# Patient Record
Sex: MALE | Race: WHITE | NOT HISPANIC OR LATINO | URBAN - METROPOLITAN AREA
[De-identification: names, ages, dates, MRNs, and addresses within clinical notes are randomized per-mention and may not be internally consistent; named-entity substitution may affect disease eponyms.]

---

## 2024-09-06 ENCOUNTER — EMERGENCY (EMERGENCY)
Facility: HOSPITAL | Age: 84
LOS: 1 days | Discharge: HOME | End: 2024-09-06
Attending: STUDENT IN AN ORGANIZED HEALTH CARE EDUCATION/TRAINING PROGRAM
Payer: MEDICARE

## 2024-09-06 VITALS
RESPIRATION RATE: 18 BRPM | WEIGHT: 235.89 LBS | TEMPERATURE: 98 F | HEART RATE: 87 BPM | SYSTOLIC BLOOD PRESSURE: 107 MMHG | OXYGEN SATURATION: 94 % | DIASTOLIC BLOOD PRESSURE: 56 MMHG | HEIGHT: 71 IN

## 2024-09-06 LAB
HCT VFR BLD CALC: 35.7 % — LOW (ref 39–50)
HGB BLD-MCNC: 12.1 G/DL — LOW (ref 13–17)
MCHC RBC-ENTMCNC: 32.5 PG — SIGNIFICANT CHANGE UP (ref 27–34)
MCHC RBC-ENTMCNC: 33.9 GM/DL — SIGNIFICANT CHANGE UP (ref 32–36)
MCV RBC AUTO: 96 FL — SIGNIFICANT CHANGE UP (ref 80–100)
PLATELET # BLD AUTO: 155 K/UL — SIGNIFICANT CHANGE UP (ref 150–400)
RBC # BLD: 3.72 M/UL — LOW (ref 4.2–5.8)
RBC # FLD: 13.2 % — SIGNIFICANT CHANGE UP (ref 10.3–14.5)
WBC # BLD: 6.98 K/UL — SIGNIFICANT CHANGE UP (ref 3.8–10.5)
WBC # FLD AUTO: 6.98 K/UL — SIGNIFICANT CHANGE UP (ref 3.8–10.5)

## 2024-09-06 PROCEDURE — 36415 COLL VENOUS BLD VENIPUNCTURE: CPT

## 2024-09-06 PROCEDURE — 99283 EMERGENCY DEPT VISIT LOW MDM: CPT | Mod: GC

## 2024-09-06 PROCEDURE — 99283 EMERGENCY DEPT VISIT LOW MDM: CPT

## 2024-09-06 PROCEDURE — 85027 COMPLETE CBC AUTOMATED: CPT

## 2024-09-06 NOTE — ED PROVIDER NOTE - PHYSICAL EXAMINATION
General: Awake, alert, lying in bed in NAD  HEENT: Normocephalic, atraumatic. No scleral icterus or conjunctival injection. EOMI. Moist mucous membranes. Oropharynx clear.   Neck:. Soft and supple.  Cardiac: RRR, Peripheral pulses 2+ and symmetric. No LE edema.  Resp: Lungs CTAB. No accessory muscle use  Abd: Soft, non-tender, non-distended. No guarding, rebound, or rigidity.  Back: Spine midline and non-tender.   Skin: 5mm  skin tear vs punctate wound to medial right lower leg, no active bleeding. multiple BLE varicose veins appreciated.   Neuro: AO x 4. Moves all extremities symmetrically. Motor strength and sensation grossly intact.  Psych: Appropriate mood and affect

## 2024-09-06 NOTE — ED PROVIDER NOTE - CLINICAL SUMMARY MEDICAL DECISION MAKING FREE TEXT BOX
84y male denies pmh BIBA for likely bleeding RLE varicose vein. bleeding controlled by pressure dressing by EMS. dressing removed without any active bleeding. wound was redressed. will check an H&H given amount of blood loss seen on phone pictures. patient otherwise well appearing, intact pulses, sensation, and motor. lower extremities non tender without any signs of infection. 84y male denies pmh BIBA for likely bleeding RLE varicose vein. bleeding controlled by pressure dressing by EMS. dressing removed without any active bleeding. wound was redressed. will check an H&H given amount of blood loss seen on phone pictures. patient otherwise well appearing, intact pulses, sensation, and motor. lower extremities non tender without any signs of infection.    H&H is 12.1/35.7, mildly decreased but not critical. wound care instructions given. patient to follow up with PCP or ED for wound check. return precautions given. stable for discharge. 84y male denies pmh BIBA for likely bleeding RLE varicose vein. bleeding controlled by pressure dressing by EMS. dressing removed without any active bleeding. wound was redressed. will check an H&H given amount of blood loss seen on phone pictures. patient otherwise well appearing, intact pulses, sensation, and motor. lower extremities non tender without any signs of infection.    H&H is 12.1/35.7, mildly decreased but not critical. wound care instructions given. patient to follow up with PCP or ED for wound check. return precautions given. stable for discharge.  ---------  Norma Urias MD, Attending  84y male no AC, presents with RLE bleeding wound. Bleeding controlled by EMS prior to arrival. Pt with hx of varicose veins, "must have bumped into something", and noticed bleeding while sitting down. Pt was unable to apply pressure on it wound and lost a significant amount of blood prior to arrival of help. Pt and daughter at bedside endorse he is otherwise at baseline state of health.     Gen: Well appearing in NAD   Head: NC/AT  Neck: trachea midline  Resp:  No distress  Ext: no deformities  Neuro:  A&O appears non focal  Skin: 5mm  skin tear vs punctate wound to medial right lower leg, no active bleeding. multiple BLE varicose veins appreciated.   Psych:  Normal affect and mood    ddx includes, but is not limited to the following: anemia, varicose vein bleeding, lower suspicion for arterial bleeding.   plan: check and dress wound, check HH given amount of blood loss in picture provided by daughter. Anticipate outpt followup.

## 2024-09-06 NOTE — ED ADULT NURSE NOTE - OBJECTIVE STATEMENT
pt A & Ox4 c/o wound to left lower leg. Respirations even and unlabored. Denies chest pain or SOB. Pt reports he was sitting in his chair when he noticed bleeding from his lower leg, pt states he was unable to hold pressure to wound until EMS controlled bleeding. No bleeding noted at this time, denies use of blood thinners. Blood work obtained and sent to lab.

## 2024-09-06 NOTE — ED PROVIDER NOTE - OBJECTIVE STATEMENT
84y male denies pmh BIBA for right lower extremity bleeding. patient denies any trauma or falls. states he noticed brisk bleeding from the lower leg while sitting down. he was unable to put any pressure on it and states he lost a significant amount of blood. bleeding controlled by ems. patient denies any fever, cough, congestion, CP, SOB, abd pain, N/V/D, leg pain or leg swelling. denies blood thinners.

## 2024-09-06 NOTE — ED PROVIDER NOTE - NSFOLLOWUPINSTRUCTIONS_ED_ALL_ED_FT
Varicose Veins  Varicose veins are veins that have become enlarged, bulged, and twisted. They most often appear in the legs.    What are the causes?  This condition is caused by damage to the valves in the vein. These valves help blood return to your heart. When they are damaged and they stop working properly, blood may flow backward and back up in the veins near the skin, causing the veins to get larger and appear twisted.    The condition can result from any issue that causes blood to back up, like pregnancy, prolonged standing, or obesity.    What increases the risk?  The following factors may make you more likely to develop this condition:  Being on your feet a lot.  Being pregnant.  Being overweight.  Smoking.  Having had a previous deep vein thrombosis or having a thrombotic disorder.  Aging. The risk increases with age.  Having a condition called Klippel–Trenaunay syndrome.  What are the signs or symptoms?  Symptoms of this condition include:  Bulging, twisted, and bluish veins.  A feeling of heaviness in your legs. This may be worse at the end of the day.  Leg pain. This may be worse at the end of the day.  Swelling in the leg.  Changes in skin color over the veins.  Swelling or pain in the legs can limit your activities. Your symptoms may get worse when you sit or stand for long periods of time.    How is this diagnosed?  This condition may be diagnosed based on:  Your symptoms, family history, activity levels, and lifestyle.  A physical exam.  You may also have tests, including an ultrasound or X-ray.    How is this treated?  Treatment for this condition may involve:  Avoiding sitting or standing in one position for long periods of time.  Wearing compression stockings. These stockings help to prevent blood clots and reduce swelling in the legs.  Raising (elevating) the legs when resting.  Losing weight.  Exercising regularly.  If you have persistent symptoms or want to improve the way your varicose veins look, you may choose to have a procedure to close the varicose veins off or to remove them.    Nonsurgical treatments to close off the veins include:  Sclerotherapy. In this treatment, a solution is injected into a vein to close it off.  Laser treatment. The vein is heated with a laser to close it off.  Radiofrequency vein ablation. An electrical current produced by radio waves is used to close off the vein.  Surgical treatments to remove the veins include:  Phlebectomy. In this procedure, the veins are removed through small incisions made over the veins.  Vein ligation and stripping. In this procedure, incisions are made over the veins. The veins are then removed after being tied (ligated) with stitches (sutures).  Follow these instructions at home:  Medicines    Take over-the-counter and prescription medicines only as told by your health care provider.  If you were prescribed an antibiotic medicine, use it as told by your health care provider. Do not stop using the antibiotic even if you start to feel better.  Activity    Walk as much as possible. Walking increases blood flow. This helps blood return to the heart and takes pressure off your veins.  Do not stand or sit in one position for a long period of time.  Do not sit with your legs crossed.  Avoid sitting for a long time without moving. Get up to take short walks every 1–2 hours. This is important to improve blood flow and breathing. Ask for help if you feel weak or unsteady.  Return to your normal activities as told by your health care provider. Ask your health care provider what activities are safe for you.  Do exercises as told by your health care provider.  General instructions    Compression stockings on a person's lower legs.  Follow any diet instructions given to you by your health care provider.  Elevate your legs at night to above the level of your heart.  If you get a cut in the skin over the varicose vein and the vein bleeds:  Lie down with your leg raised.  Apply firm pressure to the cut with a clean cloth until the bleeding stops.  Place a bandage (dressing) on the cut.  Drink enough fluid to keep your urine pale yellow.  Do not use any products that contain nicotine or tobacco. These products include cigarettes, chewing tobacco, and vaping devices, such as e-cigarettes. If you need help quitting, ask your health care provider.  Wear compression stockings as told by your health care provider. Do not wear other kinds of tight clothing around your legs, pelvis, or waist.  Keep all follow-up visits. This is important.  Contact a health care provider if:  The skin around your varicose veins starts to break down.  You have more pain, redness, tenderness, or hard swelling over a vein.  You are uncomfortable because of pain.  You get a cut in the skin over a varicose vein and it will not stop bleeding.  Get help right away if:  You have chest pain.  You have trouble breathing.  You have severe leg pain.  Summary  Varicose veins are veins that have become enlarged, bulged, and twisted. They most often appear in the legs.  This condition is caused by damage to the valves in the vein. These valves help blood return to your heart.  Treatment for this condition includes frequent movements, wearing compression stockings, losing weight, and exercising regularly. In some cases, procedures are done to close off or remove the veins.  Nonsurgical treatments to close off the veins include sclerotherapy, laser therapy, and radiofrequency vein ablation.  This information is not intended to replace advice given to you by your health care provider. Make sure you discuss any questions you have with your health care provider.

## 2024-09-06 NOTE — ED PROVIDER NOTE - ATTENDING CONTRIBUTION TO CARE
Dr. Urias: I personally saw the patient with the resident and performed a substantive portion of the visit. I performed a face to face bedside interview with patient regarding history of present illness, review of symptoms and past medical history. I completed an independent physical exam and all aspects of medical decision making. I have discussed patient's plan of care with resident. I agree with note as stated above, having amended the EMR as needed to reflect my findings. This includes HISTORY OF PRESENT ILLNESS, HIV, PAST MEDICAL/SURGICAL/FAMILY/SOCIAL HISTORY, ALLERGIES AND HOME MEDICATIONS, ROS, PHYSICAL EXAM, MEDICAL DECISION MAKING and any PROGRESS NOTES during the time I functioned as the attending physician for this patient.  SEE MDM

## 2024-09-06 NOTE — ED PROVIDER NOTE - PATIENT PORTAL LINK FT
You can access the FollowMyHealth Patient Portal offered by [facility] by registering at the following website: [email or web address]. By joining [employer]’s FollowMyHealth portal, you will also be able to view your health information using other applications (apps) compatible with our system.

## 2024-09-06 NOTE — ED PROVIDER NOTE - INTERNATIONAL TRAVEL
1) Good job with the blood sugar trend. The A1C is improving. Lab Results   Component Value Date    LABA1C 7.5 04/05/2019     Lab Results   Component Value Date    .6 04/05/2019     2) Continue partnership with Kerry Neal and the CDE! Send [employer] message after you see Dr. Yolande Marcus to your PCP Dr. Merced Gonzáles to review BP. 3) Switch to lisinopril 10 mg daily. Ideal BP goal is top number between mid 90s to below 120. Bellamy BP for bottom number is mid 50s to below 80. No

## 2024-09-09 ENCOUNTER — EMERGENCY (EMERGENCY)
Facility: HOSPITAL | Age: 84
LOS: 1 days | Discharge: HOME | End: 2024-09-09
Attending: STUDENT IN AN ORGANIZED HEALTH CARE EDUCATION/TRAINING PROGRAM
Payer: MEDICARE

## 2024-09-09 VITALS
WEIGHT: 260.15 LBS | DIASTOLIC BLOOD PRESSURE: 68 MMHG | RESPIRATION RATE: 20 BRPM | OXYGEN SATURATION: 98 % | SYSTOLIC BLOOD PRESSURE: 174 MMHG | TEMPERATURE: 98 F | HEIGHT: 71 IN | HEART RATE: 104 BPM

## 2024-09-09 PROCEDURE — 99283 EMERGENCY DEPT VISIT LOW MDM: CPT

## 2024-09-09 PROCEDURE — 99282 EMERGENCY DEPT VISIT SF MDM: CPT

## 2024-09-09 NOTE — ED ADULT NURSE NOTE - OBJECTIVE STATEMENT
A&O x4, Pt presents in ED for RLE wound check. Wrapped during arrival. Denies pain, discharge, swelling , erythema. Breathing is spontaneous even and unlabored. Bed is in lowest position and side rails up. Safety precautions maintained.

## 2024-09-09 NOTE — ED PROVIDER NOTE - CARE PROVIDER_API CALL
Tonya Houser  Vascular Surgery  [address], [address]  [place] [address]  Phone: [phone]  Fax: [phone]  Follow Up Time:

## 2024-09-09 NOTE — ED PROVIDER NOTE - PHYSICAL EXAMINATION
General: Awake, alert, lying in bed in NAD  HEENT: Normocephalic, atraumatic. No scleral icterus or conjunctival injection. EOMI. Moist mucous membranes. Oropharynx clear.   Cardiac: RRR,   Resp: Symmetric chest rise with inspiration. No accessory muscle use  Abd: Soft, non-tender, non-distended. No guarding, rebound, or rigidity.  Skin: +well healed 2cm ulcer above R medial malleolus with no active bleeding, no ecchymosis, no hematoma, no TTP on RLE. +Multiple varicose veins on b/l LE   Extremities: +palpable DP pulses bilaterally, +b/l LE edema  Neuro: AO x 4. Moves all extremities symmetrically. Motor strength and sensation grossly intact.  Psych: Appropriate mood and affect
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
## 2024-09-09 NOTE — ED PROVIDER NOTE - CARE PLAN
1 Principal Discharge DX:	Ulcer of extremity due to chronic venous insufficiency   Principal Discharge DX:	Venous insufficiency

## 2024-09-09 NOTE — ED PROVIDER NOTE - OBJECTIVE STATEMENT
85 y/o M denies PMH  presents to the ED for wound check. Patient was recently seen in ED for initial evaluation of the RLE wound. Patient denies use of anticoagulation. Patient states he has not removed the dressing and has not wet the area since the initial dressing was placed on 9/6/24. Patient states there was no recent trauma to the area. Patient denies active bleeding through the dressing and denies pain around wound site. Patient denies numbness, tingling, fever, chills. chest pain, SOB, leg pain or leg swelling.

## 2024-09-09 NOTE — ED PROVIDER NOTE - ATTENDING CONTRIBUTION TO CARE
84y M w/ no known PMH presents for wound check. Pt was just seen in this ED for bleeding from right medial ankle thought to be 2/2 varicose vein. Was advised to f/u for wound check. Pt does not have PCP so he came back to the ED. No fever or pain. On exam pt in no distress, +venous stasis changes to RLE, no signs of active bleeding to right medial ankle, no ulceration, no erythema or fluctuance. Will give referral to vascular surgeon. Stable for discharge.

## 2024-09-09 NOTE — ED PROVIDER NOTE - PATIENT PORTAL LINK FT
You can access the FollowMyHealth Patient Portal offered by [facility] by registering at the following website: [email or web address]. By joining [employer]’s FollowMyHealth portal, you will also be able to view your health information using other applications (apps) compatible with our system.

## 2024-09-09 NOTE — ED ADULT NURSE NOTE - NSFALLRISKINTERV_ED_ALL_ED

## 2024-09-09 NOTE — ED PROVIDER NOTE - CLINICAL SUMMARY MEDICAL DECISION MAKING FREE TEXT BOX
85 y/o M denies PMH  presents to the ED for wound check. Patient was recently seen in ED for initial evaluation of the RLE wound and presents today for re-check. On exam, patient has well healed 2cm ulcer above R medial malleolus with no active bleeding, no ecchymosis, no hematoma, no TTP. Discussed with patient, he can take a shower but do not scrub ulcer vigorously. Patient instructed to follow up with PCP or [facility] for continued evaluation of venous insufficiency and varicose veins. Patient is amenable to plan.

## 2024-09-09 NOTE — ED PROVIDER NOTE - NS ED ROS FT
General: Denies fever, chills  HEENT: Denies sore throat  Neck: Denies neck pain  Resp: Denies coughing, SOB  Cardiovascular: Denies CP, palpitations, LE edema  GI: Denies nausea, vomiting, abdominal pain, diarrhea, constipation, blood in stool  : Denies dysuria, hematuria  MSK: +Wound on RLE  Neuro: Denies HA, dizziness, numbness, weakness  Skin: Denies rashes.

## 2024-09-09 NOTE — ED ADULT TRIAGE NOTE - CHIEF COMPLAINT QUOTE
Pt states he was instructed to come to ED for R lower leg vernicose vein check - pt was seen here for the same on 9/6

## 2024-09-09 NOTE — ED PROVIDER NOTE - NSFOLLOWUPINSTRUCTIONS_ED_ALL_ED_FT
Venous Ulcer  A venous ulcer is a shallow sore on your lower leg. Venous ulcer is the most common type of lower leg ulcer. You may have venous ulcers on one leg or on both legs. This condition most often develops around your ankles. This type of ulcer may last for a long time (chronic ulcer) or it may return often (recurrent ulcer).    What are the causes?  This condition is caused by poor blood flow in your legs. The poor flow causes blood to pool in your legs. This can break the skin, causing an ulcer.    What increases the risk?  You are more likely to develop this condition if:  You are female.  You are 55 years of age or older.  You have had a leg ulcer in the past.  You have varicose veins.  You have clots in your lower leg veins (deep vein thrombosis).  You have irritation and swelling (inflammation) of your leg veins (phlebitis).  You have recently been pregnant.  You have a family history of chronic venous insufficiency. This is a condition where the leg veins do not pump enough blood from the legs to the heart.  Your risk may be higher if:  You are not active.  You are overweight.  You smoke.  What are the signs or symptoms?  An ulcer near the ankle.  The main symptom of this condition is an open sore near your ankle. Other symptoms may include:  Swelling.  Fluid coming from the ulcer.  Bleeding.  Itching.  Pain and swelling. This gets worse when you stand up and feels better when you raise your leg.  Changes in the skin, such as:  Thick skin.  Blotchy skin.  Dark skin.  How is this treated?  Treatments include:  Keeping your leg raised (elevated).  Wearing a type of bandage or stocking to keep pressure (compression) on the veins of your leg.  Taking medicines, including antibiotic medicines.  Cleaning your ulcer and removing any dead tissue from the wound.  Using bandages and wraps that have medicines in them to cover your ulcer.  Closing the wound using a piece of skin taken from another area of your body (graft).  Healing may take a long time. You may need to see a foot doctor or a vein specialist.    Follow these instructions at home:  Medicines    Take or apply over-the-counter and prescription medicines only as told by your doctor.  If you were prescribed antibiotics, take them as told by your doctor. Do not stop taking them even if you start to feel better.  Ask your doctor if you should take aspirin before long trips.  Wound care    Follow instructions from your doctor about how to take care of your wound. Make sure you:  Wash your hands with soap and water for at least 20 seconds before and after you change your bandage. If you cannot use soap and water, use hand .  Change your bandage.  Leave stitches or skin glue in place for at least 2 weeks.  Leave tape strips alone unless you are told to take them off. You may trim the edges of the tape strips if they curl up.  Ask when you should remove your bandage. If your bandage is dry and sticks to your leg when you try to remove it, moisten or wet the bandage with saline solution or water alone to make it easier to remove.  Check your wound every day for signs of infection. Have a caregiver do this for you if you are not able to do it yourself. Check for:  More redness, swelling, or pain.  More fluid or blood.  Warmth.  Pus or a bad smell.  Activity    Get up to take short walks every 1 to 2 hours. Ask for help if you feel weak or unsteady.  Rest with your legs raised during the day. If you can, keep your legs above the level of your heart for 30 minutes, 3–4 times a day, or as told by your doctor.  Do not sit with your legs crossed.  Return to your normal activities when your doctor says that it is safe.  General instructions    Compression stockings on a person's lower legs.  Wear elastic stockings, compression stockings, or support hose as told by your doctor.  Raise the foot of your bed as told by your doctor.  Do not smoke or use any products that contain nicotine or tobacco. If you need help quitting, ask your doctor.  Try to eat a heart healthy and low salt diet.  Keep a healthy body weight.  Keep all follow-up visits. Your doctor will check if your ulcer is healing and change treatments if needed.  Contact a doctor if:  Your ulcer is getting larger or is not healing.  Your pain gets worse.  You have any signs of infection.  You have a fever.  This information is not intended to replace advice given to you by your health care provider. Make sure you discuss any questions you have with your health care provider.

## 2024-09-12 DIAGNOSIS — I87.2 VENOUS INSUFFICIENCY (CHRONIC) (PERIPHERAL): ICD-10-CM

## 2024-09-12 DIAGNOSIS — L98.499 NON-PRESSURE CHRONIC ULCER OF SKIN OF OTHER SITES WITH UNSPECIFIED SEVERITY: ICD-10-CM
